# Patient Record
Sex: FEMALE | Race: BLACK OR AFRICAN AMERICAN | URBAN - METROPOLITAN AREA
[De-identification: names, ages, dates, MRNs, and addresses within clinical notes are randomized per-mention and may not be internally consistent; named-entity substitution may affect disease eponyms.]

---

## 2024-01-03 ENCOUNTER — NURSE ONLY (OUTPATIENT)
Dept: INTERNAL MEDICINE CLINIC | Facility: HOSPITAL | Age: 29
End: 2024-01-03
Attending: EMERGENCY MEDICINE

## 2024-01-03 DIAGNOSIS — Z00.00 WELLNESS EXAMINATION: Primary | ICD-10-CM

## 2024-01-03 PROCEDURE — 86480 TB TEST CELL IMMUN MEASURE: CPT

## 2024-01-04 LAB
M TB IFN-G CD4+ T-CELLS BLD-ACNC: 0.01 IU/ML
M TB TUBERC IFN-G BLD QL: NEGATIVE
M TB TUBERC IGNF/MITOGEN IGNF CONTROL: >10 IU/ML
QFT TB1 AG MINUS NIL: 0.02 IU/ML
QFT TB2 AG MINUS NIL: 0.02 IU/ML

## 2024-10-26 ENCOUNTER — HOSPITAL ENCOUNTER (OUTPATIENT)
Facility: HOSPITAL | Age: 29
Discharge: ANOTHER HOSPITAL | End: 2024-10-26
Attending: OBSTETRICS & GYNECOLOGY | Admitting: OBSTETRICS & GYNECOLOGY
Payer: MEDICAID

## 2024-10-26 ENCOUNTER — HOSPITAL ENCOUNTER (EMERGENCY)
Facility: HOSPITAL | Age: 29
Discharge: HOME | End: 2024-10-26
Attending: EMERGENCY MEDICINE
Payer: MEDICAID

## 2024-10-26 VITALS — DIASTOLIC BLOOD PRESSURE: 50 MMHG | SYSTOLIC BLOOD PRESSURE: 115 MMHG | HEART RATE: 73 BPM

## 2024-10-26 VITALS
SYSTOLIC BLOOD PRESSURE: 116 MMHG | RESPIRATION RATE: 18 BRPM | BODY MASS INDEX: 35.68 KG/M2 | HEIGHT: 59 IN | WEIGHT: 177 LBS | OXYGEN SATURATION: 98 % | TEMPERATURE: 98 F | DIASTOLIC BLOOD PRESSURE: 59 MMHG | HEART RATE: 74 BPM

## 2024-10-26 DIAGNOSIS — R10.13 EPIGASTRIC PAIN: Primary | ICD-10-CM

## 2024-10-26 PROCEDURE — 99282 EMERGENCY DEPT VISIT SF MDM: CPT

## 2024-10-26 RX ORDER — CHOLECALCIFEROL (VITAMIN D3) 25 MCG
1 TABLET,CHEWABLE ORAL DAILY
COMMUNITY

## 2024-10-26 NOTE — PROGRESS NOTES
Pt is a 29 year old female admitted to [facility].     Chief Complaint   Patient presents with    R/o  Labor     C/o intermittent sharp abdominal pain st the top of her abdomen and lasting about 5 minutes since yesterday.      Pt is  23w4d intra-uterine pregnancy.  History obtained, consents signed. Oriented to room, staff, and plan of care.

## 2024-10-26 NOTE — Clinical Note
Date:10/26/2024  Patient:Meg Ortega  Attending Provider:No att. providers found    Meg Ortega was evaluated in the emergency department and deemed stable for evaluation in Labor and Delivery.  She was not in the process of having a prec ipitous delivery upon transfer to Labor and Delivery.

## 2024-10-26 NOTE — ED INITIAL ASSESSMENT (HPI)
Pt arrived from [facility]. Pt   23 wks gestation. States having abd tightness, light headedness and leg tightness. Was seen by [facility] and brought back to ER for further evaluation. Symptoms have resolved.

## 2024-10-26 NOTE — ED PROVIDER NOTES
Patient Seen in: [facility] Emergency Department    History     Chief Complaint   Patient presents with    Abdomen/Flank Pain    Dizziness       HPI    29-year-old female presents to the emergency department from [facility] given that since yesterday she has had 3 episodes of 3 minutes of epigastric mild discomfort that completely resolves on its own and has felt leg tightness she states during those episodes but denies any symptoms whatsoever and denies having symptoms for at least an hour now.  Denies any nausea or emesis or any urinary symptoms.  She is approximately 23 weeks pregnant.  No fevers or chills or chest pain or any leg swelling.    History reviewed.   Past Medical History:    Hypertension       History reviewed. History reviewed. No pertinent surgical history.      Medications :  Prescriptions Prior to Admission[1]     History reviewed. No pertinent family history.    Smoking Status:   Social History     Socioeconomic History    Marital status: Unknown   Tobacco Use    Smoking status: Former     Types: Cigarettes    Smokeless tobacco: Never   Vaping Use    Vaping status: Never Used   Substance and Sexual Activity    Alcohol use: Never    Drug use: Not Currently     Types: Cannabis       Constitutional and vital signs reviewed.      Social History and Family History elements reviewed from today, pertinent positives to the presenting problem noted.    Physical Exam     ED Triage Vitals [10/26/24 0810]   /58   Pulse 76   Resp 20   Temp 97.8 °F (36.6 °C)   Temp src Temporal   SpO2 99 %   O2 Device None (Room air)       All measures to prevent infection transmission during my interaction with the patient were taken. The patient was already wearing a droplet mask on my arrival to the room. Personal protective equipment was worn throughout the duration of the exam.  Handwashing was performed prior to and after the exam.  Stethoscope and any equipment used during my examination was cleaned with super  sani-cloth germicidal wipes following the exam.     Physical Exam    General: NAD  Head: Normocephalic and atraumatic.  Mouth/Throat/Ears/Nose: Oropharynx is clear and moist.   Eyes: Conjunctivae and EOM are normal.   Neck: Normal range of motion. Supple.   Cardiovascular: Normal rate, regular rhythm, normal heart sounds.  Respiratory/Chest: Clear and equal bilaterally. Exhibits no tenderness.  Gastrointestinal: Soft, non-tender, non-distended. Bowel sounds are normal.   Musculoskeletal:No swelling or deformity.   Neurological: Alert and appropriate. No focal deficits.   Skin: Skin is warm and dry. No pallor.  Psychiatric: Has a normal mood and affect.      ED Course      Labs Reviewed - No data to display    As Interpreted by me    Imaging Results Available and Reviewed while in ED: No results found.  ED Medications Administered: Medications - No data to display      MDM     Vitals:    10/26/24 0810 10/26/24 0815 10/26/24 0829   BP: 120/58 116/59    Pulse: 76 74    Resp: 20 18    Temp: 97.8 °F (36.6 °C)  98.2 °F (36.8 °C)   TempSrc: Temporal  Temporal   SpO2: 99% 98%    Weight: 80.3 kg     Height: 149.9 cm (4' 11\")       *I personally reviewed and interpreted all ED vitals.    Pulse Ox: 98%, Room air, Normal       Medical Decision Making      Differential Diagnosis/ Diagnostic Considerations: GERD, gastritis, pancreatitis, cholelithiasis    Complicating Factors: The patient already has pregnancy to contribute to the complexity of this ED evaluation.    I reviewed prior chart records including nursing notes from [facility] prompting referral to the emergency department.  Patient's abdomen is nontender and she denies any symptoms whatsoever currently.   We discussed lab work and further workup but the patient declines this but states that she plans to return should any of her symptoms worsen.  Discharged to home in stable condition.      Disposition and Plan     Clinical Impression:  1. Epigastric pain         Disposition:  Discharge    Follow-up:  Zee Dent MD  [address]  [address]  [place] [address]  [phone]    Schedule an appointment as soon as possible for a visit in 2 day(s)  follow up with your OB, if you do not have one, you can follow up with this ob      Medications Prescribed:  Discharge Medication List as of 10/26/2024  8:31 AM                           [1] (Not in a hospital admission)

## 2024-10-26 NOTE — TRIAGE
[facility]  part of [facility]      Triage Note    Megbecca Ortega Patient Status:  Outpatient    1995 MRN [id number]   Location [facility] Attending Zee Dent MD   Hosp Day # 0 PCP No primary care provider on file.      Para:   Estimated Date of Delivery: 25  Gestation: 23w4d    Chief Complaint    R/o  Labor         Allergies:  Allergies[1]    No orders of the defined types were placed in this encounter.      No results found for: \"WBC\", \"HGB\", \"HCT\", \"PLT\", \"CREATSERUM\", \"BUN\", \"NA\", \"K\", \"CL\", \"CO2\", \"GLU\", \"CA\", \"ALB\", \"ALKPHO\", \"BILT\", \"TP\", \"AST\", \"ALT\", \"PTT\", \"INR\", \"PT\", \"T4F\", \"TSH\", \"TSHREFLEX\", \"PATRICK\", \"LIP\", \"GGT\", \"PSA\", \"DDIMER\", \"ESRML\", \"ESRPF\", \"CRP\", \"BNP\", \"MG\", \"PHOS\", \"TROP\", \"TROPHS\", \"CK\", \"CKMB\", \"SHELLY\", \"RPR\", \"B12\", \"ETOH\", \"POCGLU\", \"FFN\"    Clinitek UA  No results found for: \"URCOLOR\", \"URCLA\", \"GLUUR\", \"URINEBILI\", \"URINEKETONE\", \"SPECGRAVITY\", \"PHUR\", \"PROTURINE\", \"UROBILI\", \"URINENITRITE\", \"URINELEUK\", \"URINECUL\"    UA  No results found for: \"COLORUR\", \"CLARITY\", \"SPECGRAVITY\", \"PROUR\", \"GLUUR\", \"KETUR\", \"BILUR\", \"BLOODURINE\", \"NITRITE\", \"UROBILINOGEN\", \"LEUUR\", \"UASA\"    Vitals:    10/26/24 0721   BP: 115/50   Pulse: 73       NST                                       Baseline: 135 BPM           Uterine Irritability: No           Contractions: Not present                                                    Nonstress Test Interpretation: Appropriate for gestational age                                    Additional Comments       Chief Complaint   Patient presents with    R/o  Labor     C/o intermittent sharp abdominal pain st the top of her abdomen and lasting about 5 minutes since yesterday.     Pt denies any abdominal pain while in triage. FHT's 135. No contractions noted. Dr Dent informed of findings. Orders received to discharge to ER for further evaluation of abdominal pain.  Report to Aleida, ER charge nurse. Pt transported per wheelchair to ER.    Lizette KNOWLES RN  10/26/2024 7:50 AM         [1]   Allergies  Allergen Reactions    Penicillins ANAPHYLAXIS